# Patient Record
Sex: MALE | Race: OTHER | HISPANIC OR LATINO | Employment: FULL TIME | URBAN - METROPOLITAN AREA
[De-identification: names, ages, dates, MRNs, and addresses within clinical notes are randomized per-mention and may not be internally consistent; named-entity substitution may affect disease eponyms.]

---

## 2019-01-16 ENCOUNTER — APPOINTMENT (OUTPATIENT)
Dept: URGENT CARE | Age: 54
End: 2019-01-16
Payer: OTHER MISCELLANEOUS

## 2019-01-16 PROCEDURE — G0382 LEV 3 HOSP TYPE B ED VISIT: HCPCS | Performed by: PREVENTIVE MEDICINE

## 2019-01-16 PROCEDURE — 99283 EMERGENCY DEPT VISIT LOW MDM: CPT | Performed by: PREVENTIVE MEDICINE

## 2019-01-22 ENCOUNTER — EVALUATION (OUTPATIENT)
Dept: PHYSICAL THERAPY | Age: 54
End: 2019-01-22
Payer: OTHER MISCELLANEOUS

## 2019-01-22 DIAGNOSIS — M25.512 ACUTE PAIN OF LEFT SHOULDER: Primary | ICD-10-CM

## 2019-01-22 PROCEDURE — 97161 PT EVAL LOW COMPLEX 20 MIN: CPT | Performed by: PHYSICAL THERAPIST

## 2019-01-22 NOTE — PROGRESS NOTES
PT Evaluation     Today's date: 2019  Patient name: Mathew Lin  : 1965  MRN: [id number]  Referring provider: Braxton Parker PA-C  Dx:   Encounter Diagnosis     ICD-10-CM    1  Acute pain of left shoulder M25 512                   Assessment  Assessment details: Patient seen for PT evaluation for L shoulder pain s/p lifting injury at work  Patient presents with decreased ROM and strength in L UE, pain at rest and with activity, too guarded for further passive ROM and for special testing today  Patient hesitant to use UE and to move arm  PT established HEP for gentle stretching, ROM within tolerance  Recommended patient to perform HEP 1-2x/day as able  Scheduled for therapy, recommending -3x/week x 4 weeks  Impairments: abnormal or restricted ROM, activity intolerance, impaired physical strength, lacks appropriate home exercise program, pain with function and scapular dyskinesis  Functional limitations: Patient reports shoulder pain with lifting, reaching, dressing, IADLs, has been avoiding heavy lifting with UE  Currently on light duty  Understanding of Dx/Px/POC: good   Prognosis: good    Goals  Impairment Goals to be met within 4 weeks  - Decrease pain to 0/10 at rest, 3/10 with activity  - Improve ROM by 5 to 10 degrees grossly throughout  - Increase strength to 5/5 throughout  - Improve scapular control/mobility     Functional Goals to be met within 4-6 weeks  - Return to Prior Level of Function  - Increase Functional Status Measure to: expected  - Patient will be independent with HEP  - Patient to be able to tolerate lifting program- when applicable to assist with returning to work  - Patient to be able to return to work         Plan  Patient would benefit from: skilled physical therapy  Planned modality interventions: cryotherapy, thermotherapy: hydrocollator packs and TENS  Planned therapy interventions: abdominal trunk stabilization, manual therapy, joint mobilization, neuromuscular re-education, patient education, postural training, strengthening, stretching, therapeutic activities, therapeutic exercise and home exercise program  Frequency: 3x week  Duration in weeks: 4  Treatment plan discussed with: patient and family        Subjective Evaluation    History of Present Illness  Date of onset: 2018  Mechanism of injury: Patient reports onset of L shoulder pain after lifting a box at work  Patient reports the box weighing ~ 40 lbs  Patient currently on light duty at work- cleaning, trying to avoid using L UE  Pain  Current pain ratin  At best pain ratin  At worst pain rating: 10  Location: shoulder, L  Quality: dull ache, sharp and pressure  Aggravating factors: lifting and overhead activity  Progression: no change    Treatments  No previous or current treatments  Patient Goals  Patient goals for therapy: decreased pain, increased motion, increased strength, return to sport/leisure activities and return to work          Objective     Palpation   Left   Tenderness of the levator scapulae and upper trapezius  Right   No palpable tenderness to the levator scapulae and upper trapezius  Tenderness   Cervical Spine   Tenderness in the left scapula  Left Shoulder   Tenderness in the biceps tendon (proximal)       Active Range of Motion   Left Shoulder   Flexion: 80 degrees with pain  Abduction: 60 degrees with pain  External rotation 45°: 45 degrees with pain  Internal rotation 45°: 55 degrees with pain    Right Shoulder   Flexion: 165 degrees   Abduction: 170 degrees   External rotation 45°: 75 degrees   Internal rotation 45°: 80 degrees     Left Elbow   Normal active range of motion    Right Elbow   Normal active range of motion    Passive Range of Motion   Left Shoulder   Flexion: 90 degrees with pain  Abduction: 60 degrees with pain  External rotation 45°: 45 degrees with pain  Internal rotation 45°: 50 degrees with pain    Strength/Myotome Testing     Left Shoulder Planes of Motion   Flexion: 3-   Abduction: 3-   External rotation at 0°: 3+   Internal rotation at 0°: 3+     Right Shoulder     Planes of Motion   Flexion: 5   Abduction: 5   External rotation at 0°: 5   Internal rotation at 0°: 5     Left Elbow   Flexion: 4-  Extension: 4-    Right Elbow   Flexion: 5  Extension: 5    Tests     Additional Tests Details  Patient very guarded with passive ROM, unable to perform special testing at this time for a true assessment of shoulder pain/deficits             Precautions none    Specialty Daily Treatment Diary       Manual                                Exercise Diary                 pulleys                Wall slides flexion        Table slides flexion, scaption        Seated wedge stretch                Serratus ant 2#       triceps 2#               TB rows and extension        TB B ER yellow               UBE 2/2 x 4'                                                       Modalities         PRN

## 2019-01-23 ENCOUNTER — APPOINTMENT (OUTPATIENT)
Dept: URGENT CARE | Age: 54
End: 2019-01-23
Payer: OTHER MISCELLANEOUS

## 2019-01-23 ENCOUNTER — APPOINTMENT (OUTPATIENT)
Dept: RADIOLOGY | Age: 54
End: 2019-01-23
Payer: OTHER MISCELLANEOUS

## 2019-01-23 ENCOUNTER — TRANSCRIBE ORDERS (OUTPATIENT)
Dept: ADMINISTRATIVE | Age: 54
End: 2019-01-23

## 2019-01-23 DIAGNOSIS — T14.90XA INJURY: ICD-10-CM

## 2019-01-23 DIAGNOSIS — T14.90XA INJURY: Primary | ICD-10-CM

## 2019-01-23 PROCEDURE — 73030 X-RAY EXAM OF SHOULDER: CPT

## 2019-01-23 PROCEDURE — 99213 OFFICE O/P EST LOW 20 MIN: CPT | Performed by: PREVENTIVE MEDICINE

## 2019-01-24 ENCOUNTER — OFFICE VISIT (OUTPATIENT)
Dept: PHYSICAL THERAPY | Age: 54
End: 2019-01-24
Payer: OTHER MISCELLANEOUS

## 2019-01-24 DIAGNOSIS — M25.512 ACUTE PAIN OF LEFT SHOULDER: Primary | ICD-10-CM

## 2019-01-24 PROCEDURE — 97110 THERAPEUTIC EXERCISES: CPT | Performed by: PHYSICAL THERAPIST

## 2019-01-24 NOTE — PROGRESS NOTES
Daily Note     Today's date: 2019  Patient name: Sigrid Mcdaniels  : 1965  MRN: [id number]  Referring provider: Rosalba Crump PA-C  Dx:   Encounter Diagnosis     ICD-10-CM    1  Acute pain of left shoulder M25 512                   Subjective: Patient continuing to feel a sharp pain in the anterior shoulder with flexion directed  Movements  Feeling a "cracking" pain in posterior shoulder- at rhomboids, middle trap area on L side- appears to be d/t muscle weakness and muscle strength imbalance causing "cracking" type pain at scapular area  Talked with patient about pain, purpose of muscle strengthening/exercising to improve ROM in shoulder and muscle strength  Objective: See treatment diary below    Precautions none    Specialty Daily Treatment Diary       Manual        FOTO                        Exercise Diary                 pulleys x5'               Wall slides flexion 20x       Table slides flexion, scaption 20x       Seated wedge stretch 5x:10               Serratus ant HOLD       triceps HOLD               TB rows and extension Red 20x       TB B ER Yellow 20x               UBE 2/2 x 4'                                                       Modalities         PRN                                Assessment: Patient eager to do exercises, progressing with AROM since IE  Still c/o pain in shoulder joint and scapular muscles  Unable to perform supine TE nor seated table slides flexion d/t shoulder pain; therefore, held off on these exercises until next session  Gave band for HEP for rows, extension and TB B ER  Recommended patient to continue with previous HEP and to progress shoulder ROM  Recommended to use MH at home PRN for pain  Patient has yet to take the prescribed medication- just got the other day  Plan: Continue per plan of care

## 2019-01-31 ENCOUNTER — APPOINTMENT (OUTPATIENT)
Dept: PHYSICAL THERAPY | Age: 54
End: 2019-01-31
Payer: OTHER MISCELLANEOUS

## 2019-01-31 ENCOUNTER — APPOINTMENT (OUTPATIENT)
Dept: URGENT CARE | Age: 54
End: 2019-01-31
Payer: OTHER MISCELLANEOUS

## 2019-01-31 PROCEDURE — 99213 OFFICE O/P EST LOW 20 MIN: CPT | Performed by: PREVENTIVE MEDICINE
